# Patient Record
Sex: FEMALE | Race: OTHER | NOT HISPANIC OR LATINO | URBAN - METROPOLITAN AREA
[De-identification: names, ages, dates, MRNs, and addresses within clinical notes are randomized per-mention and may not be internally consistent; named-entity substitution may affect disease eponyms.]

---

## 2017-01-04 ENCOUNTER — IMPORTED ENCOUNTER (OUTPATIENT)
Dept: URBAN - METROPOLITAN AREA CLINIC 9 | Facility: CLINIC | Age: 82
End: 2017-01-04

## 2017-01-24 ENCOUNTER — IMPORTED ENCOUNTER (OUTPATIENT)
Dept: URBAN - METROPOLITAN AREA CLINIC 9 | Facility: CLINIC | Age: 82
End: 2017-01-24

## 2018-05-21 ENCOUNTER — IMPORTED ENCOUNTER (OUTPATIENT)
Dept: URBAN - METROPOLITAN AREA CLINIC 9 | Facility: CLINIC | Age: 83
End: 2018-05-21

## 2019-02-07 NOTE — PATIENT DISCUSSION
PATIENT HAD A VISUAL FIELD A FEW MONTHS AGO BY ANOTHER DR. WILL HAVE PATIENT SIGN A RECORDS RELEASE TO GET PAST RECORDS. WILL HOLD OFF ON TESTING FOR NOW UNTIL WE GET THE PAST RECORDS. WILL SCHEDULE AN APPT FOR 3 MONTHS OUT AND GET PART OF THE TESTING AT THAT APPT -- NERVE OCT AND PACHY AND DEPENDING ON PAST RECORDS WILL SCHEDULE VISUAL FIELD WHEN NEEDED.

## 2019-07-01 NOTE — PATIENT DISCUSSION
POAG OU:  LAST VISUAL FIELD SHOWED SLIGHT PROGRESSION OF VISION LOSS IN THE LEFT EYE. DISCUSSED OPTIONS OF STARTING DROPS VS SLT. DISCUSSED RBA'S OF BOTH. PATIENT WISHES TO PROCEED WITH  SLT.   WILL SCHEDULE SLT OS THEN OD.

## 2019-07-10 ENCOUNTER — IMPORTED ENCOUNTER (OUTPATIENT)
Dept: URBAN - METROPOLITAN AREA CLINIC 9 | Facility: CLINIC | Age: 84
End: 2019-07-10

## 2019-07-25 NOTE — PATIENT DISCUSSION
Continue: Pred Forte (prednisolone acetate): drops,suspension: 1% 1 drop four times a day into affected eye 07-

## 2019-12-09 NOTE — PATIENT DISCUSSION
CATARACTS, OU: VISUALLY SIGNIFICANT. SURGERY INDICATED. PATIENT WISHES TO WAIT AT THIS TIME. PATIENT TO CALL BACK IF THEY DECIDE TO PROCEED WITH SURGERY WITHIN 6 MONTHS. OTHERWISE, FOLLOW AS SCHEDULED.
COAG OU- CONTROLLED WITH SLT OFF DROPS
General:
98.6

## 2020-03-09 NOTE — PATIENT DISCUSSION
DERMATOCHALASIS (UPPER LIDS), OU:  VISUALLY SIGNIFICANT TO PATIENT.   WILL SCHEDULE UPPER LID BLEPHAROPLASTY, OU AFTER THE CATARACT SURGERY

## 2020-03-12 NOTE — PATIENT DISCUSSION
PATIENT DESIRES STANDARD LENS OD FOR CAT SX WITH KDB. PT UNDERSTANDS HE WILL NEED GLASSES FOR READING AND MAY NEED GLASSES FOR DISTANCE DUE TO ANY UNCORRECTED ASTIGMATISM.

## 2020-03-12 NOTE — PATIENT DISCUSSION
New Prescription: prednisol ace-gatiflox-bromfen (prednisol ace-gatiflox-bromfen): drops,suspension: 1-0.5-0.075% 1 drop four times a day into affected eye 03-

## 2020-03-12 NOTE — PATIENT DISCUSSION
[place] Dual Blade Counseling:  I have explained to the patient at length the diagnosis of primary open angle glaucoma and its pathophysiology. I have discussed the various treatment options including medications and surgery. I have discussed the option of the [place] Dual Blade at the time of cataract surgery should visual field testing indicate field loss. I have discussed the risks and benefits associated with [place] Dual blade at the time of cataract surgery. The [facility] Dual blade will benefit the patient's current glaucoma therapy in an attempt to prevent the need for maximum medical therapy. The patient is currently on ocular hypotensive medications to control their glaucoma. Patient understands and wishes to consider.

## 2020-06-11 NOTE — PATIENT DISCUSSION
[place] Dual Blade Counseling:  I have explained to the patient at length the diagnosis of primary open angle glaucoma and its pathophysiology. I have discussed the various treatment options including medications and surgery. I have discussed the option of the [place] Dual Blade at the time of cataract surgery should visual field testing indicate field loss. I have discussed the risks and benefits associated with [place] Dual blade at the time of cataract surgery. The [facility] Dual blade will benefit the patient's current glaucoma therapy in an attempt to prevent the need for maximum medical therapy. The patient is currently on ocular hypotensive medications to control their glaucoma. Patient understands and wishes to consider.

## 2020-07-13 NOTE — PATIENT DISCUSSION
Continue: prednisol ace-gatiflox-bromfen (prednisol ace-gatiflox-bromfen): drops,suspension: 1-0.5-0.075% 1 drop four times a day into affected eye 03-

## 2020-07-13 NOTE — PATIENT DISCUSSION
[place] Dual Blade Counseling:  I have explained to the patient at length the diagnosis of primary open angle glaucoma and its pathophysiology. I have discussed the various treatment options including medications and surgery. I have discussed the option of the [place] Dual Blade at the time of cataract surgery should visual field testing indicate field loss. I have discussed the risks and benefits associated with [place] Dual blade at the time of cataract surgery. The [facility] Dual blade will benefit the patient's current glaucoma therapy in an attempt to prevent the need for maximum medical therapy. The patient is currently on ocular hypotensive medications to control their glaucoma. Patient understands and wishes to consider.

## 2020-07-13 NOTE — PATIENT DISCUSSION
Pre-Op 2nd Eye Counseling: The patient has noticed an improvement in their visual symptoms in the operative eye. The patient complains of decreased vision in the fellow eye when distance vision. It was explained to the patient that the decision to proceed with cataract surgery in the fellow eye is entirely a separate decision from the surgical eye. All of the same risks, benefits and alternatives ere reviewed with the patient again. The patient does feel the vision in the non-operative eye is limiting their daily activities and elects to proceed with cataract surgery in the left eye.

## 2021-07-30 ENCOUNTER — IMPORTED ENCOUNTER (OUTPATIENT)
Dept: URBAN - METROPOLITAN AREA CLINIC 9 | Facility: CLINIC | Age: 86
End: 2021-07-30

## 2021-08-18 ENCOUNTER — IMPORTED ENCOUNTER (OUTPATIENT)
Dept: URBAN - METROPOLITAN AREA CLINIC 9 | Facility: CLINIC | Age: 86
End: 2021-08-18

## 2021-08-27 ENCOUNTER — IMPORTED ENCOUNTER (OUTPATIENT)
Dept: URBAN - METROPOLITAN AREA CLINIC 9 | Facility: CLINIC | Age: 86
End: 2021-08-27

## 2021-09-17 ENCOUNTER — PREPPED CHART (OUTPATIENT)
Dept: URBAN - METROPOLITAN AREA CLINIC 17 | Facility: CLINIC | Age: 86
End: 2021-09-17

## 2021-09-17 ENCOUNTER — IMPORTED ENCOUNTER (OUTPATIENT)
Dept: URBAN - METROPOLITAN AREA CLINIC 9 | Facility: CLINIC | Age: 86
End: 2021-09-17

## 2021-10-17 ASSESSMENT — TONOMETRY
OS_IOP_MMHG: 28
OD_IOP_MMHG: 21
OD_IOP_MMHG: 18
OS_IOP_MMHG: 23
OD_IOP_MMHG: 20
OS_IOP_MMHG: 20
OD_IOP_MMHG: 18
OS_IOP_MMHG: 18
OS_IOP_MMHG: 17
OD_IOP_MMHG: 18
OD_IOP_MMHG: 18
OS_IOP_MMHG: 17
OS_IOP_MMHG: 24
OD_IOP_MMHG: 25

## 2021-10-17 ASSESSMENT — VISUAL ACUITY
OS_CC: 20/50 - SN
OS_CC: 20/25 - SN
OD_CC: 20/30 SN
OD_CC: 20/20 SN
OS_CC: 20/30 -2 SN
OD_CC: 20/25 -2 SN
OD_CC: 20/30 -2 SN
OD_CC: 20/20 -2 SN
OD_CC: 20/25 - SN
OS_SC: CF 2FT SN
OD_CC: 20/40 SN
OD_CC: 20/20 - SN
OS_CC: CF 3' LV
OS_CC: CF 2FT SN
OS_CC: CF 2FT SN
OS_CC: 20/30 -2 SN

## 2021-10-17 ASSESSMENT — KERATOMETRY
OS_K1POWER_DIOPTERS: 44.5
OD_K2POWER_DIOPTERS: 43.5
OD_K1POWER_DIOPTERS: 44.25
OD_AXISANGLE_DEGREES: 5
OS_AXISANGLE2_DEGREES: 53
OS_K2POWER_DIOPTERS: 42.75
OD_AXISANGLE2_DEGREES: 95
OS_AXISANGLE_DEGREES: 143

## 2021-10-29 ENCOUNTER — ESTABLISHED PATIENT (OUTPATIENT)
Dept: URBAN - METROPOLITAN AREA CLINIC 17 | Facility: CLINIC | Age: 86
End: 2021-10-29

## 2021-10-29 DIAGNOSIS — H35.3132: ICD-10-CM

## 2021-10-29 DIAGNOSIS — H35.342: ICD-10-CM

## 2021-10-29 PROCEDURE — 92134 CPTRZ OPH DX IMG PST SGM RTA: CPT

## 2021-10-29 PROCEDURE — 99024 POSTOP FOLLOW-UP VISIT: CPT

## 2021-10-29 ASSESSMENT — TONOMETRY
OD_IOP_MMHG: 22
OS_IOP_MMHG: 20

## 2021-10-29 ASSESSMENT — VISUAL ACUITY
OS_CC: CF 6FT
OD_CC: 20/25-2

## 2021-11-12 NOTE — PROCEDURE NOTE: CLINICAL
PROCEDURE NOTE: SLT OS. Diagnosis: POAG, Mild. Anesthesia: Topical. Prep: Betadine Flush. Prior to treatment, the risks/benefits/alternatives were discussed. The patient wished to proceed with procedure. Lens:  SLT laser lens with goniosol. Power: 1mJ. Total applications: 341. Application 995 Degrees. Patient tolerated procedure well. There were no complications. Post-op instructions given. Kathryn Blanchard

## 2022-04-04 NOTE — PATIENT DISCUSSION
WILL NEED CLEARANCE FROM PCP FOR BLEPHAROPLASTY TO STOP BLOOD THINNERS 2 WEEKS PRIOR TO SURGERY.  WILL SCHEDULE PRE-OP.

## 2022-04-08 ENCOUNTER — ESTABLISHED PATIENT (OUTPATIENT)
Dept: URBAN - METROPOLITAN AREA CLINIC 17 | Facility: CLINIC | Age: 87
End: 2022-04-08

## 2022-04-08 DIAGNOSIS — H43.813: ICD-10-CM

## 2022-04-08 DIAGNOSIS — H35.3132: ICD-10-CM

## 2022-04-08 DIAGNOSIS — H35.342: ICD-10-CM

## 2022-04-08 PROCEDURE — 92014 COMPRE OPH EXAM EST PT 1/>: CPT

## 2022-04-08 PROCEDURE — 92134 CPTRZ OPH DX IMG PST SGM RTA: CPT

## 2022-04-08 ASSESSMENT — VISUAL ACUITY
OS_CC: 20/400
OD_CC: 20/25-1

## 2022-04-08 ASSESSMENT — TONOMETRY
OD_IOP_MMHG: 19
OS_IOP_MMHG: 16

## 2022-06-29 NOTE — PATIENT DISCUSSION
Recommended warm compresses to remove custiness around suture line. Removed  sutures in office today.

## 2022-10-14 ENCOUNTER — ESTABLISHED PATIENT (OUTPATIENT)
Dept: URBAN - METROPOLITAN AREA CLINIC 17 | Facility: CLINIC | Age: 87
End: 2022-10-14

## 2022-10-14 DIAGNOSIS — H43.813: ICD-10-CM

## 2022-10-14 DIAGNOSIS — H35.3132: ICD-10-CM

## 2022-10-14 DIAGNOSIS — H35.342: ICD-10-CM

## 2022-10-14 PROCEDURE — 92134 CPTRZ OPH DX IMG PST SGM RTA: CPT

## 2022-10-14 PROCEDURE — 92014 COMPRE OPH EXAM EST PT 1/>: CPT

## 2022-10-14 ASSESSMENT — VISUAL ACUITY
OS_CC: CF 6FT
OD_CC: 20/25-1

## 2022-10-14 ASSESSMENT — TONOMETRY
OS_IOP_MMHG: 21
OD_IOP_MMHG: 21

## 2023-04-14 ENCOUNTER — ESTABLISHED PATIENT (OUTPATIENT)
Dept: URBAN - METROPOLITAN AREA CLINIC 17 | Facility: CLINIC | Age: 88
End: 2023-04-14

## 2023-04-14 DIAGNOSIS — H35.342: ICD-10-CM

## 2023-04-14 DIAGNOSIS — H35.3132: ICD-10-CM

## 2023-04-14 DIAGNOSIS — H43.813: ICD-10-CM

## 2023-04-14 PROCEDURE — 92014 COMPRE OPH EXAM EST PT 1/>: CPT

## 2023-04-14 PROCEDURE — 92134 CPTRZ OPH DX IMG PST SGM RTA: CPT

## 2023-04-14 ASSESSMENT — TONOMETRY
OD_IOP_MMHG: 21
OS_IOP_MMHG: 21

## 2023-04-14 ASSESSMENT — VISUAL ACUITY
OD_CC: 20/25-2
OS_CC: 20/100-1

## 2023-10-20 ENCOUNTER — ESTABLISHED PATIENT (OUTPATIENT)
Dept: URBAN - METROPOLITAN AREA CLINIC 17 | Facility: CLINIC | Age: 88
End: 2023-10-20

## 2023-10-20 DIAGNOSIS — H35.342: ICD-10-CM

## 2023-10-20 DIAGNOSIS — H35.3132: ICD-10-CM

## 2023-10-20 DIAGNOSIS — H43.813: ICD-10-CM

## 2023-10-20 PROCEDURE — 99214 OFFICE O/P EST MOD 30 MIN: CPT

## 2023-10-20 PROCEDURE — 92134 CPTRZ OPH DX IMG PST SGM RTA: CPT

## 2023-10-20 ASSESSMENT — VISUAL ACUITY
OD_CC: 20/25
OS_CC: 20/200-1

## 2023-10-20 ASSESSMENT — TONOMETRY
OS_IOP_MMHG: 18
OD_IOP_MMHG: 19

## 2024-04-26 ENCOUNTER — ESTABLISHED PATIENT (OUTPATIENT)
Dept: URBAN - METROPOLITAN AREA CLINIC 17 | Facility: CLINIC | Age: 89
End: 2024-04-26

## 2024-04-26 DIAGNOSIS — H35.342: ICD-10-CM

## 2024-04-26 DIAGNOSIS — H43.813: ICD-10-CM

## 2024-04-26 DIAGNOSIS — H35.3132: ICD-10-CM

## 2024-04-26 DIAGNOSIS — H04.123: ICD-10-CM

## 2024-04-26 PROCEDURE — 92134 CPTRZ OPH DX IMG PST SGM RTA: CPT

## 2024-04-26 PROCEDURE — 99214 OFFICE O/P EST MOD 30 MIN: CPT

## 2024-04-26 ASSESSMENT — TONOMETRY
OD_IOP_MMHG: 19
OS_IOP_MMHG: 18

## 2024-04-26 ASSESSMENT — VISUAL ACUITY
OS_CC: 20/200
OD_CC: 20/30+1

## 2024-10-25 ENCOUNTER — COMPREHENSIVE EXAM (OUTPATIENT)
Dept: URBAN - METROPOLITAN AREA CLINIC 17 | Facility: CLINIC | Age: 89
End: 2024-10-25

## 2024-10-25 DIAGNOSIS — H35.342: ICD-10-CM

## 2024-10-25 DIAGNOSIS — H43.813: ICD-10-CM

## 2024-10-25 DIAGNOSIS — H35.3132: ICD-10-CM

## 2024-10-25 PROCEDURE — 99214 OFFICE O/P EST MOD 30 MIN: CPT

## 2024-10-25 PROCEDURE — 92134 CPTRZ OPH DX IMG PST SGM RTA: CPT

## 2025-04-25 ENCOUNTER — COMPREHENSIVE EXAM (OUTPATIENT)
Age: 89
End: 2025-04-25

## 2025-04-25 DIAGNOSIS — H43.813: ICD-10-CM

## 2025-04-25 DIAGNOSIS — H31.8: ICD-10-CM

## 2025-04-25 DIAGNOSIS — H35.3132: ICD-10-CM

## 2025-04-25 DIAGNOSIS — H35.342: ICD-10-CM

## 2025-04-25 PROCEDURE — 92134 CPTRZ OPH DX IMG PST SGM RTA: CPT

## 2025-04-25 PROCEDURE — 92201 OPSCPY EXTND RTA DRAW UNI/BI: CPT

## 2025-04-25 PROCEDURE — 99214 OFFICE O/P EST MOD 30 MIN: CPT
